# Patient Record
Sex: MALE | Race: OTHER
[De-identification: names, ages, dates, MRNs, and addresses within clinical notes are randomized per-mention and may not be internally consistent; named-entity substitution may affect disease eponyms.]

---

## 2020-06-20 ENCOUNTER — HOSPITAL ENCOUNTER (EMERGENCY)
Dept: HOSPITAL 61 - ER | Age: 55
Discharge: HOME | End: 2020-06-20
Payer: COMMERCIAL

## 2020-06-20 VITALS — WEIGHT: 220.46 LBS | HEIGHT: 72 IN | BODY MASS INDEX: 29.86 KG/M2

## 2020-06-20 VITALS — DIASTOLIC BLOOD PRESSURE: 78 MMHG | SYSTOLIC BLOOD PRESSURE: 151 MMHG
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
## 2020-06-20 DIAGNOSIS — S20.219A: ICD-10-CM

## 2020-06-20 DIAGNOSIS — S63.502A: Primary | ICD-10-CM
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
## 2020-06-20 DIAGNOSIS — R51: ICD-10-CM

## 2020-06-20 DIAGNOSIS — M25.522: ICD-10-CM

## 2020-06-20 DIAGNOSIS — F17.200: ICD-10-CM

## 2020-06-20 PROCEDURE — 96372 THER/PROPH/DIAG INJ SC/IM: CPT

## 2020-06-20 PROCEDURE — 71046 X-RAY EXAM CHEST 2 VIEWS: CPT

## 2020-06-20 PROCEDURE — 99284 EMERGENCY DEPT VISIT MOD MDM: CPT
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
